# Patient Record
Sex: MALE | Race: BLACK OR AFRICAN AMERICAN | Employment: FULL TIME | URBAN - NONMETROPOLITAN AREA
[De-identification: names, ages, dates, MRNs, and addresses within clinical notes are randomized per-mention and may not be internally consistent; named-entity substitution may affect disease eponyms.]

---

## 2021-05-17 ENCOUNTER — HOSPITAL ENCOUNTER (EMERGENCY)
Age: 41
Discharge: HOME | End: 2021-05-17
Attending: FAMILY MEDICINE

## 2021-05-17 ENCOUNTER — APPOINTMENT (OUTPATIENT)
Dept: GENERAL RADIOLOGY | Age: 41
End: 2021-05-17
Attending: FAMILY MEDICINE

## 2021-05-17 VITALS
BODY MASS INDEX: 28.85 KG/M2 | HEART RATE: 88 BPM | WEIGHT: 232 LBS | RESPIRATION RATE: 17 BRPM | TEMPERATURE: 98.8 F | DIASTOLIC BLOOD PRESSURE: 102 MMHG | SYSTOLIC BLOOD PRESSURE: 191 MMHG | OXYGEN SATURATION: 100 % | HEIGHT: 75 IN

## 2021-05-17 DIAGNOSIS — S62.639B OPEN FRACTURE OF TUFT OF DISTAL PHALANX OF FINGER: Primary | ICD-10-CM

## 2021-05-17 PROCEDURE — 99283 EMERGENCY DEPT VISIT LOW MDM: CPT

## 2021-05-17 PROCEDURE — 90715 TDAP VACCINE 7 YRS/> IM: CPT | Performed by: FAMILY MEDICINE

## 2021-05-17 PROCEDURE — 74011000250 HC RX REV CODE- 250: Performed by: FAMILY MEDICINE

## 2021-05-17 PROCEDURE — 74011250637 HC RX REV CODE- 250/637: Performed by: FAMILY MEDICINE

## 2021-05-17 PROCEDURE — 73130 X-RAY EXAM OF HAND: CPT

## 2021-05-17 PROCEDURE — 90471 IMMUNIZATION ADMIN: CPT

## 2021-05-17 PROCEDURE — 96372 THER/PROPH/DIAG INJ SC/IM: CPT

## 2021-05-17 PROCEDURE — 74011250636 HC RX REV CODE- 250/636: Performed by: FAMILY MEDICINE

## 2021-05-17 RX ORDER — CEPHALEXIN 500 MG/1
500 CAPSULE ORAL 3 TIMES DAILY
Qty: 21 CAP | Refills: 0 | Status: SHIPPED | OUTPATIENT
Start: 2021-05-17 | End: 2021-05-24

## 2021-05-17 RX ORDER — HYDROCODONE BITARTRATE AND ACETAMINOPHEN 5; 325 MG/1; MG/1
1 TABLET ORAL
Qty: 12 TAB | Refills: 0 | Status: SHIPPED | OUTPATIENT
Start: 2021-05-17 | End: 2021-05-20

## 2021-05-17 RX ORDER — KETOROLAC TROMETHAMINE 10 MG/1
10 TABLET, FILM COATED ORAL ONCE
Status: COMPLETED | OUTPATIENT
Start: 2021-05-17 | End: 2021-05-17

## 2021-05-17 RX ORDER — KETOROLAC TROMETHAMINE 10 MG/1
10 TABLET, FILM COATED ORAL
Qty: 10 TAB | Refills: 0 | Status: SHIPPED | OUTPATIENT
Start: 2021-05-17

## 2021-05-17 RX ADMIN — TETANUS TOXOID, REDUCED DIPHTHERIA TOXOID AND ACELLULAR PERTUSSIS VACCINE, ADSORBED 0.5 ML: 5; 2.5; 8; 8; 2.5 SUSPENSION INTRAMUSCULAR at 17:58

## 2021-05-17 RX ADMIN — KETOROLAC TROMETHAMINE 10 MG: 10 TABLET, FILM COATED ORAL at 17:58

## 2021-05-17 RX ADMIN — LIDOCAINE HYDROCHLORIDE 1 G: 10 INJECTION, SOLUTION INFILTRATION; PERINEURAL at 18:02

## 2021-05-17 NOTE — ED PROVIDER NOTES
EMERGENCY DEPARTMENT HISTORY AND PHYSICAL EXAM      Date: 5/17/2021  Patient Name: Pierre Morillo.    History of Presenting Illness     Chief Complaint   Patient presents with    Laceration       History Provided By: Patient    HPI: Pierre Morillo., 38 y.o. male with a past medical history significant No significant past medical history presents to the ED with cc of laceration to the distal tip of his ring finger on his right hand. Patient was working on a motor with his brother when his finger got sucked up into the turbo. He complains of a 10 out of 10 pain. He does not know when his last tetanus was. There are no other complaints, changes, or physical findings at this time. PCP: No primary care provider on file. No current facility-administered medications on file prior to encounter. No current outpatient medications on file prior to encounter. Past History     Past Medical History:  History reviewed. No pertinent past medical history. Past Surgical History:  Past Surgical History:   Procedure Laterality Date    HX HERNIA REPAIR         Family History:  History reviewed. No pertinent family history. Social History:  Social History     Tobacco Use    Smoking status: Never Smoker    Smokeless tobacco: Never Used   Substance Use Topics    Alcohol use: Not Currently    Drug use: Never       Allergies:  No Known Allergies      Review of Systems     Review of Systems   Musculoskeletal:        Laceration to finger     Neurological: Negative for weakness and numbness. Physical Exam     Physical Exam  Vitals signs and nursing note reviewed. Constitutional:       General: He is awake. He is in acute distress. Appearance: Normal appearance. He is well-developed and normal weight. He is not ill-appearing, toxic-appearing or diaphoretic. Interventions: Face mask in place. HENT:      Head: Normocephalic and atraumatic.    Eyes:      Conjunctiva/sclera: Conjunctivae normal.      Pupils: Pupils are equal, round, and reactive to light. Neck:      Musculoskeletal: Normal range of motion and neck supple. Cardiovascular:      Rate and Rhythm: Normal rate and regular rhythm. Pulses: Normal pulses. Heart sounds: Normal heart sounds. Pulmonary:      Effort: Pulmonary effort is normal.      Breath sounds: Normal breath sounds. Abdominal:      Tenderness: There is no abdominal tenderness. Musculoskeletal:      Comments: There is a partial amputation of the distal tip of his right ring finger. The nail has been disturbed. Skin:     General: Skin is warm and dry. Neurological:      Mental Status: He is alert and oriented to person, place, and time. GCS: GCS eye subscore is 4. GCS verbal subscore is 5. GCS motor subscore is 6. Psychiatric:         Mood and Affect: Mood and affect normal.         Behavior: Behavior normal. Behavior is cooperative. Thought Content: Thought content normal.         Lab and Diagnostic Study Results     Labs -   No results found for this or any previous visit (from the past 12 hour(s)). Radiologic Studies -   @lastxrresult@  CT Results  (Last 48 hours)    None        CXR Results  (Last 48 hours)    None            Medical Decision Making   - I am the first provider for this patient. - I reviewed the vital signs, available nursing notes, past medical history, past surgical history, family history and social history. - Initial assessment performed. The patients presenting problems have been discussed, and they are in agreement with the care plan formulated and outlined with them. I have encouraged them to ask questions as they arise throughout their visit. Vital Signs-Reviewed the patient's vital signs.   Patient Vitals for the past 12 hrs:   Temp Pulse Resp BP SpO2   05/17/21 1737 98.8 °F (37.1 °C) 88 17 (!) 191/102 100 %       Records Reviewed: Nursing Notes          ED Course:          Provider Notes (Medical Decision Making):     0560 -patient has an open fracture of his distal phalanx on his right ring finger. There is nothing to suture at this time. Patient's been given IM antibiotics. He has been given a tetanus booster. He is can be sent home on antibiotics and pain meds. Dressings been applied. He is going to follow-up with Dr. Bakari Agrawal in the next 1 to 2 days. Is been given a work note. [facility]       Procedures   Medical Decision Makingedical Decision Making  Performed by: Aria Back MD  PROCEDURES:  Procedures       Disposition   Disposition:     Discharged    DISCHARGE PLAN:  1. Current Discharge Medication List      START taking these medications    Details   cephALEXin (Keflex) 500 mg capsule Take 1 Cap by mouth three (3) times daily for 7 days. Qty: 21 Cap, Refills: 0      HYDROcodone-acetaminophen (Norco) 5-325 mg per tablet Take 1 Tab by mouth every six (6) hours as needed for Pain for up to 3 days. Max Daily Amount: 4 Tabs. Qty: 12 Tab, Refills: 0    Associated Diagnoses: Open fracture of tuft of distal phalanx of finger      ketorolac (TORADOL) 10 mg tablet Take 1 Tab by mouth every six (6) hours as needed for Pain. Qty: 10 Tab, Refills: 0           2. Follow-up Information     Follow up With Specialties Details Why Contact Info    Gustavo Fermin MD Orthopedic Surgery In 1 day  [address] [place] [address],[address]  [phone]          3. Return to ED if worse   4. Current Discharge Medication List      START taking these medications    Details   cephALEXin (Keflex) 500 mg capsule Take 1 Cap by mouth three (3) times daily for 7 days. Qty: 21 Cap, Refills: 0  Start date: 5/17/2021, End date: 5/24/2021      HYDROcodone-acetaminophen (Norco) 5-325 mg per tablet Take 1 Tab by mouth every six (6) hours as needed for Pain for up to 3 days. Max Daily Amount: 4 Tabs.   Qty: 12 Tab, Refills: 0  Start date: 5/17/2021, End date: 5/20/2021    Associated Diagnoses: Open fracture of tuft of distal phalanx of finger      ketorolac (TORADOL) 10 mg tablet Take 1 Tab by mouth every six (6) hours as needed for Pain. Qty: 10 Tab, Refills: 0  Start date: 5/17/2021               Diagnosis     Clinical Impression:   1. Open fracture of tuft of distal phalanx of finger        Attestations:    Elio Osullivan MD    Please note that this dictation was completed with Endgame, the computer voice recognition software. Quite often unanticipated grammatical, syntax, homophones, and other interpretive errors are inadvertently transcribed by the computer software. Please disregard these errors. Please excuse any errors that have escaped final proofreading. Thank you.

## 2021-05-17 NOTE — Clinical Note
[facility] EMERGENCY DEPT 
[facility] [place] [facility] [address] 
[phone] Work/School Note Date: 5/17/2021 To Whom It May concern: 
 
Naldo Anthonycielo Young was seen and treated today in the emergency room by the following provider(s): 
Attending Provider: Scott Boggs MD. Mati Urbina is excused from work/school on 5/17/2021 through 5/20/2021. He is medically clear to return to work/school on 5/21/2021. Sincerely, Javy Romeo MD

## 2021-05-17 NOTE — ED TRIAGE NOTES
Pt reports he was working on a big truck when his right ring finger got struck by a turbine on the truck removing the tip of his finger. Incident occurred about 20 minutes ago.

## 2021-05-19 ENCOUNTER — OFFICE VISIT (OUTPATIENT)
Dept: ORTHOPEDIC SURGERY | Age: 41
End: 2021-05-19

## 2021-05-19 VITALS — BODY MASS INDEX: 28.85 KG/M2 | WEIGHT: 232 LBS | HEIGHT: 75 IN

## 2021-05-19 DIAGNOSIS — M25.441 SWELLING OF JOINT OF RIGHT HAND: Primary | ICD-10-CM

## 2021-05-19 PROCEDURE — 99203 OFFICE O/P NEW LOW 30 MIN: CPT | Performed by: ORTHOPAEDIC SURGERY

## 2021-05-19 NOTE — PATIENT INSTRUCTIONS

## 2021-05-19 NOTE — PROGRESS NOTES
Name: Dunia Gilbert    : 1980     Service Dept: [address] [place] [address] and Sports Medicine    Patient's Pharmacies:    [facility] [address], [address] [place] [address] [address] [address]  Phone: [phone] Fax: [phone]       Chief Complaint   Patient presents with    Hand Pain        Visit Vitals  Ht 6' 3\" (1.905 m)   Wt 232 lb (105.2 kg)   BMI 29.00 kg/m²      No Known Allergies   Current Outpatient Medications   Medication Sig Dispense Refill    cephALEXin (Keflex) 500 mg capsule Take 1 Cap by mouth three (3) times daily for 7 days. 21 Cap 0    HYDROcodone-acetaminophen (Norco) 5-325 mg per tablet Take 1 Tab by mouth every six (6) hours as needed for Pain for up to 3 days. Max Daily Amount: 4 Tabs. 12 Tab 0    ketorolac (TORADOL) 10 mg tablet Take 1 Tab by mouth every six (6) hours as needed for Pain. 10 Tab 0      There is no problem list on file for this patient. Family History   Problem Relation Age of Onset    No Known Problems Mother     No Known Problems Father       Social History     Socioeconomic History    Marital status: SINGLE     Spouse name: Not on file    Number of children: Not on file    Years of education: Not on file    Highest education level: Not on file   Tobacco Use    Smoking status: Never Smoker    Smokeless tobacco: Never Used   Vaping Use    Vaping Use: Never used   Substance and Sexual Activity    Alcohol use: Not Currently    Drug use: Never     Social Determinants of Health     Financial Resource Strain:     Difficulty of Paying Living Expenses:    Food Insecurity:     Worried About Running Out of Food in the Last Year:     Ran Out of Food in the Last Year:    Transportation Needs:     Lack of Transportation (Medical):      Lack of Transportation (Non-Medical):    Physical Activity:     Days of Exercise per Week:     Minutes of Exercise per Session:    Stress:     Feeling of Stress :    Social Connections:     Frequency of Communication with Friends and Family:     Frequency of Social Gatherings with Friends and Family:     Attends Bahai Services:     Active Member of Clubs or Organizations:     Attends Club or Organization Meetings:     Marital Status:       Past Surgical History:   Procedure Laterality Date    HX HERNIA REPAIR        Past Medical History:   Diagnosis Date    Hypertension         I have reviewed and agree with [address] and ROS and intake form in chart and the record furthermore I have reviewed prior medical record(s) regarding this patients care during this appointment. Review of Systems:   Patient is a pleasant appearing individual, appropriately dressed, well hydrated, well nourished, who is alert, appropriately oriented for age, and in no acute distress with a normal gait and normal affect who does not appear to be in any significant pain. Physical Exam:  Left Hand - No point tenderness, Full range of motion, No instability, No Weakness, No, skin lesions, No swelling, Grossly neurovascularly intact. Encounter Diagnoses     ICD-10-CM ICD-9-CM   1. Swelling of joint of right hand  M25.441 719.04       Physical examination shows he has got a partial amputation, decreased range of motion, decreased strength, some point tenderness, grossly neurovascularly intact except in the tip. HPI:  The patient is here with a chief complaint of right hand pain, throbbing, burning pain. It has been the same. Pain is 7/10. X-rays of the right hand are positive for right 4th finger distal phalanx fracture. Assessment/Plan:  Plan at this point, we will have him see [facility]. We will see him back as needed. If he gets worse, he is to give me a call. As part of continued conservative pain management options the patient was advised to utilize Tylenol or OTC NSAIDS as long as it is not medically contraindicated. Return to Office:    Follow-up and Dispositions    · Return for we will call. Scribed by Eddie Choi LPN as dictated by [facility] SIMA Torres MD.  Documentation True and Accepted Chester Torres MD

## 2021-05-19 NOTE — PROGRESS NOTES
Right 2nd finger wrapped with xeroform, 4x4, coban and finger splint applied.   Signed By: Eneida Oglesby     May 19, 2021

## 2021-05-19 NOTE — LETTER
Gait: Norm  Limp  [id number]   Chair   [place] Sophia Ventura Sr.  :1980     Today's Date:2021 JDL:[id number] FINGER Est  2  3  4 New  2  3   Consult  3    Pre-op         Post-op     No LOS Injection Utrasound                     Injection w/o Ultrasound [id number] 
[id number] - Trigger                    [id number] [facility],[address] FINGER 
LT effusion  M25.442 RT effusion  M25.441 LT mallet  M20.12 RT mallet  M20.011 LT sprain  S63. 92XA RT sprain  S63. 91XA TRIGGER FINGER 
LT index  M65.322 RT index  M65.321 LT middle  M65.332          RT middle  M65.331 LT pinky  M65.352          RT pinky  M65.351 LT ring              M65.342         RT ring              M65.341 LT thumb  M65.312 RT thumb  M65.311 FINGER FX 
Distal phalanx    [id number]                   Proximal phalanx  [id number] LT index distal    S62.661A       RT index distal     S62.660A      
LT index middle  S62.651A       RT index middle   S62.650A     
LT index proximal S62.641A       RT index proximal  S62.640A    
LT middle distal S62.663A       RT middle distal  S62.662A  
LT middle middle S62.653A       RT middle middle  S62.652A  
LT middle proximal S62.643A       RT middle proximal             S62.642A  
LT pinky distal  S62.667A       RT pinky distal   S62.666A  
LT pinky middle S62.657A       RT pinky middle  S62.656A  
LT pinky proximal S62.647A       RT pinky proximal  S62.646A  
LT ring distal  S62.665A       RT ring distal              L04.775L LT ring middle  S62.655A RT ring middle              S62.654A LT ring proximal S62.645A RT ring proximal  S62.644A  
LT thumb distal S62.525A RT thumb distal  K28.818W LT thumb proximal S62.514A RT thumb proximal              S62.515A Splinting 29130-finger splint 53589-ajqji taping fingers